# Patient Record
Sex: FEMALE | Race: ASIAN | NOT HISPANIC OR LATINO | Employment: UNEMPLOYED | URBAN - METROPOLITAN AREA
[De-identification: names, ages, dates, MRNs, and addresses within clinical notes are randomized per-mention and may not be internally consistent; named-entity substitution may affect disease eponyms.]

---

## 2017-02-16 ENCOUNTER — OFFICE VISIT (OUTPATIENT)
Dept: INTERNAL MEDICINE | Facility: CLINIC | Age: 47
End: 2017-02-16
Payer: COMMERCIAL

## 2017-02-16 VITALS
WEIGHT: 163.56 LBS | HEART RATE: 82 BPM | BODY MASS INDEX: 27.25 KG/M2 | HEIGHT: 65 IN | DIASTOLIC BLOOD PRESSURE: 90 MMHG | TEMPERATURE: 96 F | SYSTOLIC BLOOD PRESSURE: 124 MMHG

## 2017-02-16 DIAGNOSIS — Z12.31 ENCOUNTER FOR SCREENING MAMMOGRAM FOR HIGH-RISK PATIENT: ICD-10-CM

## 2017-02-16 DIAGNOSIS — Z00.00 ANNUAL PHYSICAL EXAM: Primary | ICD-10-CM

## 2017-02-16 DIAGNOSIS — Z11.59 ENCOUNTER FOR HEPATITIS C SCREENING TEST FOR LOW RISK PATIENT: ICD-10-CM

## 2017-02-16 DIAGNOSIS — Z28.39 IMMUNIZATION DEFICIENCY: ICD-10-CM

## 2017-02-16 PROCEDURE — 99396 PREV VISIT EST AGE 40-64: CPT | Mod: 25,S$GLB,, | Performed by: FAMILY MEDICINE

## 2017-02-16 PROCEDURE — 99999 PR PBB SHADOW E&M-EST. PATIENT-LVL III: CPT | Mod: PBBFAC,,, | Performed by: FAMILY MEDICINE

## 2017-02-16 PROCEDURE — 90715 TDAP VACCINE 7 YRS/> IM: CPT | Mod: S$GLB,,, | Performed by: FAMILY MEDICINE

## 2017-02-16 PROCEDURE — 90471 IMMUNIZATION ADMIN: CPT | Mod: S$GLB,,, | Performed by: FAMILY MEDICINE

## 2017-02-16 NOTE — PROGRESS NOTES
Subjective:       Patient ID: Mayra Mathias is a 47 y.o. female.    Chief Complaint: Establish Care and Immunizations    HPI Comments: Establish Care:       Pt is a 47 year old who has no ongoing medical issues. Pt is need of Tdap and mammogram but is up-to-date on her women wellness with PAP    Review of Systems   Constitutional: Negative.  Negative for activity change, chills, fatigue and fever.   HENT: Negative.  Negative for congestion, ear pain, postnasal drip, rhinorrhea, sinus pressure, sore throat and trouble swallowing.    Eyes: Negative for visual disturbance.   Respiratory: Negative.  Negative for cough, chest tightness, shortness of breath and wheezing.    Gastrointestinal: Negative.    Endocrine: Negative.  Negative for cold intolerance and heat intolerance.   Genitourinary: Negative.  Negative for dysuria, hematuria, urgency, vaginal bleeding and vaginal discharge.   Musculoskeletal: Negative for arthralgias, back pain, joint swelling and neck stiffness.   Skin: Negative for color change and rash.   Neurological: Negative.  Negative for dizziness, tremors, seizures, syncope, weakness, light-headedness and headaches.   Hematological: Negative.    Psychiatric/Behavioral: Negative.  Negative for agitation, confusion, sleep disturbance and suicidal ideas. The patient is not nervous/anxious.        Objective:      Physical Exam   Constitutional: She is oriented to person, place, and time. She appears well-developed and well-nourished.   HENT:   Head: Normocephalic.   Eyes: EOM are normal. Pupils are equal, round, and reactive to light.   Neck: Normal range of motion. No thyromegaly present.   Cardiovascular: Normal rate and regular rhythm.    No murmur heard.  Pulmonary/Chest: Effort normal and breath sounds normal.   Abdominal: Soft. Bowel sounds are normal.   Musculoskeletal: Normal range of motion.   Neurological: She is alert and oriented to person, place, and time. She has normal reflexes.   Skin: Skin is  warm.   Psychiatric: She has a normal mood and affect. Her behavior is normal.   Vitals reviewed.      Assessment:       1. Annual physical exam    2. Immunization deficiency    3. Encounter for hepatitis C screening test for low risk patient    4. Encounter for screening mammogram for high-risk patient        Plan:       Annual physical exam  Comments:  Will do CBC, CMP and Lipid.  Orders:  -     CBC auto differential; Future; Expected date: 2/16/17  -     Comprehensive metabolic panel; Future; Expected date: 2/16/17  -     Lipid panel; Future; Expected date: 2/16/17    Immunization deficiency  Comments:  Tdap  Orders:  -     Tdap Vaccine    Encounter for hepatitis C screening test for low risk patient  Comments:  Screen for Hep C  Orders:  -     Hepatitis C antibody; Future; Expected date: 2/16/17    Encounter for screening mammogram for high-risk patient  Comments:  Will do mammogram  Orders:  -     Mammo Digital Screening Bilateral With CAD; Future; Expected date: 2/16/17

## 2017-02-16 NOTE — MR AVS SNAPSHOT
"    [facility]  [address] [place] [address]  Phone: [phone]  Fax: [phone]                  Mayra Mathias   2017 9:40 AM   Office Visit    Description:  Female : 1970   Provider:  Keith Negrete MD   Department:  [facility] - Internal Medicine           Reason for Visit     Establish Care     Immunizations           Diagnoses this Visit        Comments    Annual physical exam    -  Primary Will do CBC, CMP and Lipid.    Immunization deficiency     Tdap    Encounter for hepatitis C screening test for low risk patient     Screen for Hep C    Encounter for screening mammogram for high-risk patient     Will do mammogram           To Do List           Future Appointments        Provider Department Dept Phone    2017 9:40 AM Keith Negrete MD [facility] [phone]      Goals (5 Years of Data)     None      [facility]     [facility] On Call Nurse Care Line -  Assistance  Registered nurses in the [facility] On Call Center provide clinical advisement, health education, appointment booking, and other advisory services.  Call for this free service at [phone].             Medications           Message regarding Medications     Verify the changes and/or additions to your medication regime listed below are the same as discussed with your clinician today.  If any of these changes or additions are incorrect, please notify your healthcare provider.             Verify that the below list of medications is an accurate representation of the medications you are currently taking.  If none reported, the list may be blank. If incorrect, please contact your healthcare provider. Carry this list with you in case of emergency.                Clinical Reference Information           Your Vitals Were     BP Pulse Temp    124/90 (BP Location: Right arm, Patient Position: Sitting, BP Method: Manual) 82 95.9 °F (35.5 °C) (Tympanic)    Height Weight BMI    5' 5" (1.651 m) " 74.2 kg (163 lb 9.3 oz) 27.22 kg/m2      Blood Pressure          Most Recent Value    BP  (!)  124/90      Allergies as of 2/16/2017     No Known Allergies      Immunizations Administered on Date of Encounter - 2/16/2017     Name Date Dose VIS Date Route    TDAP  Incomplete 0.5 mL 2/24/2015 Intramuscular      Orders Placed During Today's Visit      Normal Orders This Visit    Tdap Vaccine     Future Labs/Procedures Expected by Expires    CBC auto differential  2/16/2017 4/17/2018    Comprehensive metabolic panel  2/16/2017 4/17/2018    Hepatitis C antibody  2/16/2017 4/17/2018    Lipid panel  2/16/2017 2/16/2018    Mammo Digital Screening Bilateral With CAD  2/16/2017 4/16/2018      [facility] Sign-Up     Activating your [facility] account is as easy as 1-2-3!     1) Visit [email or web address], select Sign Up Now, enter this activation code and your date of birth, then select Next.  [email or web address]  Expires: 4/2/2017  9:22 AM      2) Create a username and password to use when you visit [facility] in the future and select a security question in case you lose your password and select Next.    3) Enter your e-mail address and click Sign Up!    Additional Information  If you have questions, please e-mail [email or web address] or call [phone] to talk to our [facility] staff. Remember, [facility] is NOT to be used for urgent needs. For medical emergencies, dial 911.         Language Assistance Services     ATTENTION: Language assistance services are available, free of charge. Please call [phone].      ATENCIÓN: Si habla español, tiene a hernandez disposición servicios gratuitos de asistencia lingüística. Llame al [phone].     CHÚ Ý: N?u b?n nói Ti?ng Vi?t, có các d?ch v? h? tr? ngôn ng? mi?n phí dành cho b?n. G?i s? [phone].         [facility] complies with applicable Federal civil rights laws and does not discriminate on the basis of race, color, national origin, age, disability, or sex.

## 2017-05-22 PROBLEM — Z00.00 ANNUAL PHYSICAL EXAM: Status: RESOLVED | Noted: 2017-02-16 | Resolved: 2017-05-22

## 2018-01-23 ENCOUNTER — TELEPHONE (OUTPATIENT)
Dept: OPHTHALMOLOGY | Facility: CLINIC | Age: 48
End: 2018-01-23

## 2022-11-09 ENCOUNTER — PATIENT MESSAGE (OUTPATIENT)
Dept: RESEARCH | Facility: HOSPITAL | Age: 52
End: 2022-11-09
Payer: COMMERCIAL

## 2025-02-03 ENCOUNTER — TELEPHONE (OUTPATIENT)
Dept: OTOLARYNGOLOGY | Facility: CLINIC | Age: 55
End: 2025-02-03
Payer: COMMERCIAL

## 2025-02-03 NOTE — TELEPHONE ENCOUNTER
----- Message from [place] sent at 2/3/2025  8:34 AM CST -----  Regarding: Appt  Contact: Monserrat [phone]  German /  is calling to speak to nurse about a appt for pt; this is the 2nd call please call

## 2025-02-05 ENCOUNTER — OFFICE VISIT (OUTPATIENT)
Dept: OTOLARYNGOLOGY | Facility: CLINIC | Age: 55
End: 2025-02-05
Payer: COMMERCIAL

## 2025-02-05 VITALS — BODY MASS INDEX: 26.3 KG/M2 | WEIGHT: 158.06 LBS | SYSTOLIC BLOOD PRESSURE: 128 MMHG | DIASTOLIC BLOOD PRESSURE: 80 MMHG

## 2025-02-05 DIAGNOSIS — J34.89 NASAL CAVITY MASS: ICD-10-CM

## 2025-02-05 DIAGNOSIS — R49.0 DYSPHONIA: ICD-10-CM

## 2025-02-05 DIAGNOSIS — D38.0 NEOPLASM OF UNCERTAIN BEHAVIOR OF LARYNX: Primary | ICD-10-CM

## 2025-02-05 PROCEDURE — 99204 OFFICE O/P NEW MOD 45 MIN: CPT | Mod: 25,S$GLB,, | Performed by: OTOLARYNGOLOGY

## 2025-02-05 PROCEDURE — 31579 LARYNGOSCOPY TELESCOPIC: CPT | Mod: S$GLB,,, | Performed by: OTOLARYNGOLOGY

## 2025-02-05 PROCEDURE — 99999 PR PBB SHADOW E&M-EST. PATIENT-LVL II: CPT | Mod: PBBFAC,,, | Performed by: OTOLARYNGOLOGY

## 2025-02-05 NOTE — PROGRESS NOTES
[facility]  Department of Otorhinolaryngology and Communication Sciences    Mayra Mathias is a 54 y.o. female who [facility] the [facility] for consultation at the kind request of Dr. Mik Cee for further management of  vocal cord mass .     She complains of a bad voice.  She reports that her voice acutely deteriorated a proximally 1 year ago following a screaming episode.  Her voice changed abruptly and has slowly deteriorated since that time.  She had seen an ENT doctor around that time, who noted some redness and prescribed PPI for treatment of LPR.  Her symptoms deteriorated in the meantime.  She ultimately was evaluated recently by Dr. Cee, who noted a growth in the larynx as well as some irregularities in the nasal cavity.    She is otherwise healthy.  She is a lifetime nonsmoker.  She denies throat pain, odynophagia, otalgia, hemoptysis, hematemesis, neck mass.  She denies nasal obstruction, facial pain pressure, colored nasal discharge, epistaxis, vision changes.  She denies dyspnea or noisy breathing.      Past Medical History  Fatty liver    Past Surgical History  None    Family History  Her family history includes Liver cancer in her father.    Social History  She reports that she has never smoked. She has never used smokeless tobacco. She reports that she does not drink alcohol and does not use drugs.    Allergies  She has No Known Allergies.    Medications  None    Review of Systems   Constitutional: Negative.  Negative for fever.   HENT:  Positive for voice change. Negative for sore throat.    Eyes: Negative.  Negative for visual disturbance.   Respiratory:  Negative for wheezing.    Cardiovascular: Negative.  Negative for chest pain.   Gastrointestinal: Negative.  Negative for nausea.   Endocrine: Negative.    Genitourinary: Negative.    Musculoskeletal: Negative.  Negative for arthralgias.   Skin: Negative.  Negative for rash.   Allergic/Immunologic: Negative.    Neurological: Negative.   Negative for tremors.   Hematological: Negative.  Does not bruise/bleed easily.   Psychiatric/Behavioral: Negative.  The patient is not nervous/anxious.           Objective:     VS reviewed  Physical Exam  Constitutional: comfortable, well dressed  Psychiatric: appropriate affect  Respiratory: comfortably breathing, symmetric chest rise, no stridor  Voice: moderate to severe dense breathy strain  Cardiovascular: upper extremities non-edematous  Lymphatic: no cervical lymphadenopathy  Neurologic: alert and oriented to time, place, person, and situation; cranial nerves 3-12 grossly intact  Head: normocephalic  Eyes: conjunctivae and sclerae clear  Ears: normal pinnae, normal external auditory canals, tympanic membranes intact  Nose: mucosa pink and noncongested, no masses, no mucopurulence, no polyps  Oral cavity / oropharynx: no mucosal lesions  Neck: soft, full range of motion, laryngotracheal complex palpable with appropriate landmarks, larynx elevates on swallowing  Indirect laryngoscopy: limited due to gag    Procedure  Flexible Laryngeal Videostroboscopy (75688): Laryngeal videostroboscopy is indicated to assess laryngeal vibratory biomechanics and vocal fold oscillation, which cannot be assessed with a plain light examination. This was carried out transnasally with a distal chip videoendoscope. After verbal consent was obtained, the patient was positioned and the nose was topically decongested with 1% phenylephrine and topically anesthetized with 4% lidocaine. The endoscope was passed through the most patent nasal cavity and positioned to image the nasopharynx, larynx, and hypopharynx in detail. The following features were examined: nasopharyngeal, laryngeal, hypopharyngeal masses; velopharyngeal strength, closure, and symmetry of motion; vocal fold range and symmetry of motion; laryngeal mucosal edema, erythema, inflammation, and hydration; salivary pooling; and gross laryngeal sensation. During phonation, the  vocal folds were assessed for glottal closure; mucosal wave; vocal fold lesions; vibratory periodicity, amplitude, and phase symmetry; and vertical height match. The equipment was removed. The patient tolerated the procedure well without complication. 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 female with upper aerodigestive mucosal irregularities, both nasal and endolaryngeal.  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       Plan:        I had a discussion with the patient and her family  regarding her condition and the further workup and management options.      I offered to take her to the operating room for excision of the laryngeal mass and biopsy of the nasal lesion.  I discussed with her the risks and benefits thereof.  I gave her the opportunity to ask questions, and answered all them to their satisfaction.  She wishes to proceed.  We will arrange this in the coming weeks, with preoperative testing triage to be completed by the anesthesiology team.    I will make sure she follows up with 1 of our departmental rhinologists for definitive assessment/management of the endonasal findings.    All questions were answered, and the patient is in agreement with the above.     Valentin Argueta M.D.  [facility]  Department of Otorhinolaryngology and Communication Sciences

## 2025-02-06 ENCOUNTER — TELEPHONE (OUTPATIENT)
Dept: OTOLARYNGOLOGY | Facility: CLINIC | Age: 55
End: 2025-02-06
Payer: COMMERCIAL

## 2025-02-06 DIAGNOSIS — D38.0 NEOPLASM OF UNCERTAIN BEHAVIOR OF LARYNX: Primary | ICD-10-CM

## 2025-02-06 DIAGNOSIS — R49.0 DYSPHONIA: ICD-10-CM

## 2025-02-06 DIAGNOSIS — J34.89 NASAL CAVITY MASS: ICD-10-CM

## 2025-02-13 ENCOUNTER — TELEPHONE (OUTPATIENT)
Dept: OTOLARYNGOLOGY | Facility: CLINIC | Age: 55
End: 2025-02-13
Payer: COMMERCIAL

## 2025-02-13 NOTE — TELEPHONE ENCOUNTER
Surgery cancelled.  Dr. Argueta has been in contact with Dr. Mireles regarding patient.    ----- Message -----  From: Amanda Sy  Sent: 2/10/2025  11:35 AM CST  To: [facility] Staff  Subject: procedure cancellation                           PATIENT CALL    Pt's  Monserrat called regarding upcoming MICROLARYNGOSCOPY. States that they'd like to cancel, no reason given. Asked x2 if they definitely wanted to cancel and not reschedule. Pt's  verified that this is correct, will call us back if they are ever interested in resuming care.

## 2025-04-08 ENCOUNTER — PATIENT MESSAGE (OUTPATIENT)
Dept: OTOLARYNGOLOGY | Facility: CLINIC | Age: 55
End: 2025-04-08
Payer: COMMERCIAL